# Patient Record
Sex: MALE
[De-identification: names, ages, dates, MRNs, and addresses within clinical notes are randomized per-mention and may not be internally consistent; named-entity substitution may affect disease eponyms.]

---

## 2018-07-02 ENCOUNTER — HOSPITAL ENCOUNTER (EMERGENCY)
Dept: HOSPITAL 97 - ER | Age: 33
Discharge: HOME | End: 2018-07-02
Payer: COMMERCIAL

## 2018-07-02 VITALS — DIASTOLIC BLOOD PRESSURE: 79 MMHG | SYSTOLIC BLOOD PRESSURE: 140 MMHG | OXYGEN SATURATION: 97 % | TEMPERATURE: 99.5 F

## 2018-07-02 DIAGNOSIS — N39.0: Primary | ICD-10-CM

## 2018-07-02 LAB — UA COMPLETE W REFLEX CULTURE PNL UR: (no result)

## 2018-07-02 PROCEDURE — 87186 SC STD MICRODIL/AGAR DIL: CPT

## 2018-07-02 PROCEDURE — 87086 URINE CULTURE/COLONY COUNT: CPT

## 2018-07-02 PROCEDURE — 99283 EMERGENCY DEPT VISIT LOW MDM: CPT

## 2018-07-02 PROCEDURE — 87077 CULTURE AEROBIC IDENTIFY: CPT

## 2018-07-02 PROCEDURE — 81015 MICROSCOPIC EXAM OF URINE: CPT

## 2018-07-02 PROCEDURE — 87088 URINE BACTERIA CULTURE: CPT

## 2018-07-02 PROCEDURE — 81003 URINALYSIS AUTO W/O SCOPE: CPT

## 2018-07-02 NOTE — EDPHYS
Physician Documentation                                                                           

 [facility]                                                                

Name: Fran Gaming                                                                                 

Age: 32 yrs                                                                                       

Sex: Male                                                                                         

: 1985                                                                                   

MRN: [id number]                                                                                   

Arrival Date: 2018                                                                          

Time: 19:59                                                                                       

Account#: [id number]                                                                            

Bed 11                                                                                            

Private MD: Arely Mayer Z                                                                      

ED Physician Ubaldo Kaufman                                                                     

HPI:                                                                                              

                                                                                             

20:41 This 32 yrs old  Male presents to ER via Ambulatory with complaints of Pain     tw4 

      With Urination.                                                                             

20:41 The patient presents with urinary symptoms, dysuria, urinary frequency. Onset: The      tw4 

      symptoms/episode began/occurred today. Modifying factors: The symptoms are alleviated       

      by nothing. Associated signs and symptoms: The patient has no apparent associated signs     

      or symptoms. Severity of symptoms: At their worst the symptoms were moderate, in the        

      emergency department the symptoms are unchanged. The patient has not experienced            

      similar symptoms in the past.                                                               

                                                                                                  

Historical:                                                                                       

- Allergies:                                                                                      

20:37 No Known Allergies;                                                                     jd3 

- Home Meds:                                                                                      

20:37 Prilosec Oral [Active];                                                                 jd3 

- PMHx:                                                                                           

20:37 None;                                                                                   jd3 

- PSHx:                                                                                           

20:37 Cholecystectomy; thumb sx;                                                              jd3 

                                                                                                  

- Immunization history:: Adult Immunizations up to date.                                          

- Social history:: Smoking status: Patient/guardian denies using tobacco.                         

- Ebola Screening: : Patient negative for fever greater than or equal to 101.5 degrees            

  Fahrenheit, and additional compatible Ebola Virus Disease symptoms.                             

                                                                                                  

                                                                                                  

ROS:                                                                                              

20:41 Constitutional: Negative for fever, chills, and weight loss.                            tw4 

20:41 : Positive for urinary symptoms, hematuria, foul smelling urine, Negative for injury      

      or acute deformity, pelvic pain, flank pain, burning with urination, difficulty             

      urinating.                                                                                  

                                                                                                  

Exam:                                                                                             

20:41 Constitutional:  This is a well developed, well nourished patient who is awake, alert,  tw4 

      and in no acute distress. Head/Face:  Normocephalic, atraumatic. Chest/axilla:  Normal      

      chest wall appearance and motion.  Nontender with no deformity.  No lesions are             

      appreciated. Cardiovascular:  Regular rate and rhythm with a normal S1 and S2.  No          

      gallops, murmurs, or rubs.  Normal PMI, no JVD.  No pulse deficits. Respiratory:  Lungs     

      have equal breath sounds bilaterally, clear to auscultation and percussion.  No rales,      

      rhonchi or wheezes noted.  No increased work of breathing, no retractions or nasal          

      flaring.                                                                                    

20:41 Abdomen/GI: Inspection: abdomen appears normal, Bowel sounds: normal, Palpation: mild       

      abdominal tenderness, in the suprapubic area.                                               

                                                                                                  

Vital Signs:                                                                                      

20:37  / 79; Pulse 83; Resp 17 S; Temp 99.5(O); Pulse Ox 97% on R/A; Weight 117.93 kg   jd3 

      (R); Height 5 ft. 11 in. (180.34 cm) (R); Pain 8/10;                                        

20:37 Body Mass Index 36.26 (117.93 kg, 180.34 cm)                                            jd3 

                                                                                                  

MDM:                                                                                              

20:27 Patient medically screened.                                                             tw4 

20:41 Differential diagnosis: nonspecific abdominal pain, UTI, urinary retention, urethritis. tw4 

      Data reviewed: vital signs, nurses notes. Counseling: I had a detailed discussion with      

      the patient and/or guardian regarding: the historical points, exam findings, and any        

      diagnostic results supporting the discharge/admit diagnosis, radiology results. Special     

      discussion: I discussed with the patient/guardian in detail that at this point there is     

      no indication for admission to the hospital. It is understood, however, that if the         

      symptoms persist or worsen the patient needs to return immediately for re-evaluation.       

                                                                                                  

                                                                                             

20:42 Order name: Urine Dipstick--Ancillary (enter results)                                     

                                                                                             

20:28 Order name: Urine Dipstick-Ancillary (obtain specimen); Complete Time: 20:41            tw4 

                                                                                             

20:42 Order name: Urine Microscopic Only                                                        

                                                                                             

20:42 Order name: Urine Culture                                                                 

                                                                                                  

Administered Medications:                                                                         

20:49 Drug: Cipro 500 mg Route: PO;                                                           jd3 

20:49 Follow up: Response: Medication administered at discharge.                              jd3 

                                                                                                  

                                                                                                  

Disposition:                                                                                      

18 20:44 Discharged to Home. Impression: Urinary tract infection, site not specified.       

- Condition is Stable.                                                                            

- Discharge Instructions: Urinary Tract Infection, Antibiotic Use, Easy-to-Read.                  

- Prescriptions for Cipro 500 mg Oral Tablet - take 1 tablet by ORAL route every 12               

  hours for 10 days; 20 tablet.                                                                   

- Work release form, Medication Reconciliation Form, Thank You Letter, Antibiotic                 

  Education, Prescription Opioid Use form.                                                        

- Follow up: Private Physician; When: As needed; Reason: Recheck today's complaints,              

  Re-evaluation by your physician.                                                                

- Problem is new.                                                                                 

- Symptoms have improved.                                                                         

                                                                                                  

                                                                                                  

                                                                                                  

Signatures:                                                                                       

Dispatcher [employer]                           Yusef Beavers RN                    RN   jd3                                                  

Ubaldo Kaufman MD MD   tw4                                                  

                                                                                                  

Corrections: (The following items were deleted from the chart)                                    

20:57 20:44 2018 20:44 Discharged to Home. Impression: Urinary tract infection, site    jd3 

      not specified. Condition is Stable. Forms are Medication Reconciliation Form, Thank You     

      Letter, Antibiotic Education, Prescription Opioid Use. Follow up: Private Physician;        

      When: As needed; Reason: Recheck today's complaints, Re-evaluation by your physician.       

      Problem is new. Symptoms have improved. tw4                                                 

                                                                                                  

**************************************************************************************************

## 2018-07-02 NOTE — ER
Nurse's Notes                                                                                     

 [facility]                                                                

Name: Fran Gaming                                                                                 

Age: 32 yrs                                                                                       

Sex: Male                                                                                         

: 1985                                                                                   

MRN: [id number]                                                                                   

Arrival Date: 2018                                                                          

Time: 19:59                                                                                       

Account#: [id number]                                                                            

Bed 11                                                                                            

Private MD: Arely Mayer Z                                                                      

Diagnosis: Urinary tract infection, site not specified                                            

                                                                                                  

Presentation:                                                                                     

                                                                                             

20:33 Presenting complaint: Patient states: "Around 1700, I started having really bad pains   jd3 

      when I urinate. I started to pee blood with clots shortly after, and now it all just        

      feels sore and hurts when I pee.". Transition of care: patient was not received from        

      another setting of care. Onset of symptoms was 2018. Risk Assessment: Do you       

      want to hurt yourself or someone else? Patient reports no desire to harm self or            

      others. Initial Sepsis Screen: Does the patient meet any 2 criteria? No. Patient's          

      initial sepsis screen is negative. Does the patient have a suspected source of              

      infection? No. Patient's initial sepsis screen is negative. Care prior to arrival: None.    

20:33 Method Of Arrival: Ambulatory                                                           jd3 

20:33 Acuity: DOMINIQUE 3                                                                           jd3 

                                                                                                  

Historical:                                                                                       

- Allergies:                                                                                      

20:37 No Known Allergies;                                                                     jd3 

- Home Meds:                                                                                      

20:37 Prilosec Oral [Active];                                                                 jd3 

- PMHx:                                                                                           

20:37 None;                                                                                   jd3 

- PSHx:                                                                                           

20:37 Cholecystectomy; thumb sx;                                                              jd3 

                                                                                                  

- Immunization history:: Adult Immunizations up to date.                                          

- Social history:: Smoking status: Patient/guardian denies using tobacco.                         

- Ebola Screening: : Patient negative for fever greater than or equal to 101.5 degrees            

  Fahrenheit, and additional compatible Ebola Virus Disease symptoms.                             

                                                                                                  

                                                                                                  

Screenin:42 Abuse screen: Denies threats or abuse. Nutritional screening: No deficits noted.        jd3 

      Tuberculosis screening: No symptoms or risk factors identified. Fall Risk Ambulatory        

      Aid- None/Bed Rest/Nurse Assist (0 pts). Gait- Normal/Bed Rest/Wheelchair (0 pts)           

      Mental Status- Oriented to own ability (0 pts). Total Perez Fall Scale indicates No         

      Risk (0-24 pts).                                                                            

                                                                                                  

Assessment:                                                                                       

20:38 General: Appears uncomfortable, Behavior is calm, cooperative, appropriate for age.     jd3 

      Pain: Complains of pain in groin Pain currently is 8 out of 10 on a pain scale. Quality     

      of pain is described as aching, Pain began 3 hours ago. Is intermittent, Aggravated by      

      using the restroom. Neuro: Level of Consciousness is awake, alert, obeys commands,          

      Oriented to person, place, time, situation, Appropriate for age. Cardiovascular:            

      Capillary refill < 3 seconds Patient's skin is warm and dry. Respiratory: Airway is         

      patent Respiratory effort is even, unlabored, Respiratory pattern is regular,               

      symmetrical. GI: Abdomen is round Abd is soft and non tender X 4 quads. Reports nausea.     

      : Urine is blood tinged, Reports pain in suprapubic area scrotum, with urination,         

      Pain is 8 out of 10 on a pain scale. Scrotal pain: sudden onset urinary frequency.          

      EENT: No signs and/or symptoms were reported regarding the EENT system. Derm: Skin is       

      intact, Skin is dry, Skin is normal, Skin temperature is warm. Musculoskeletal:             

      Circulation, motion, and sensation intact. Range of motion: intact in all extremities.      

20:56 Reassessment: Patient appears in no apparent distress at this time. Patient and/or      jd3 

      family updated on plan of care and expected duration. Pain level reassessed. Patient is     

      alert, oriented x 3, equal unlabored respirations, skin warm/dry/pink. pt reported          

      understanding of discharge instructions, even and steady gait upon discharge.               

                                                                                                  

Vital Signs:                                                                                      

20:37  / 79; Pulse 83; Resp 17 S; Temp 99.5(O); Pulse Ox 97% on R/A; Weight 117.93 kg   jd3 

      (R); Height 5 ft. 11 in. (180.34 cm) (R); Pain 8/10;                                        

20:37 Body Mass Index 36.26 (117.93 kg, 180.34 cm)                                            jd3 

                                                                                                  

ED Course:                                                                                        

19:59 Patient arrived in ED.                                                                  ds1 

20:00 Arely Mayer MD is Private Physician.                                                 ds1 

20:17 Ubaldo Kaufman MD is Attending Physician.                                            tw4 

20:33 Yusef Logan RN is Primary Nurse.                                                  jd3 

20:35 Triage completed.                                                                       jd3 

20:38 Arm band placed on.                                                                     jd3 

20:42 Patient has correct armband on for positive identification. Call light in reach. Adult  jd3 

      w/ patient.                                                                                 

20:56 No provider procedures requiring assistance completed. Patient did not have IV access   jd3 

      during this emergency room visit.                                                           

                                                                                                  

Administered Medications:                                                                         

20:49 Drug: Cipro 500 mg Route: PO;                                                           jd3 

20:49 Follow up: Response: Medication administered at discharge.                              jd3 

                                                                                                  

                                                                                                  

Outcome:                                                                                          

20:44 Discharge ordered by MD.                                                                tw4 

20:57 Discharged to home ambulatory, with family.                                             jd3 

20:57 Condition: stable                                                                           

20:57 Discharge instructions given to patient, family, Instructed on discharge instructions,      

      follow up and referral plans. medication usage, Demonstrated understanding of               

      instructions, follow-up care, medications, Prescriptions given X 1.                         

20:57 Patient left the ED.                                                                    jd3 

                                                                                                  

Addendum:                                                                                         

2018                                                                                        

     07:44 Addendum: Culture Results: Positive urine culture. No further action required. Bacteria i
w

           sensitive to prescribed antibiotic.                                                    

                                                                                                  

Signatures:                                                                                       

Nu Price                                ds1                                                  

Suzan Matos RN RN   iw                                                   

Yusef Logan RN RN   jUbaldo Amaya MD MD   tw4                                                  

                                                                                                  

Corrections: (The following items were deleted from the chart)                                    

                                                                                             

20:36 20:33 Initial Sepsis Screen: Does the patient meet any 2 criteria? No. Patient's        jd3 

      initial sepsis screen is negative. Does the patient have a suspected source of              

      infection? No. Patient's initial sepsis screen is negative. jd3                             

                                                                                                  

**************************************************************************************************